# Patient Record
Sex: MALE | Race: WHITE | NOT HISPANIC OR LATINO | URBAN - METROPOLITAN AREA
[De-identification: names, ages, dates, MRNs, and addresses within clinical notes are randomized per-mention and may not be internally consistent; named-entity substitution may affect disease eponyms.]

---
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
## 2018-01-16 ENCOUNTER — FORM ENCOUNTER (OUTPATIENT)
Age: 18
End: 2018-01-16

## 2018-01-17 ENCOUNTER — APPOINTMENT (OUTPATIENT)
Dept: MRI IMAGING | Facility: HOSPITAL | Age: 18
End: 2018-01-17
Payer: COMMERCIAL

## 2018-01-17 ENCOUNTER — OUTPATIENT (OUTPATIENT)
Dept: OUTPATIENT SERVICES | Age: 18
LOS: 1 days | End: 2018-01-17

## 2018-01-17 DIAGNOSIS — Q23.1 CONGENITAL INSUFFICIENCY OF AORTIC VALVE: ICD-10-CM

## 2018-01-17 DIAGNOSIS — Q21.0 VENTRICULAR SEPTAL DEFECT: ICD-10-CM

## 2018-01-17 DIAGNOSIS — R68.89 OTHER GENERAL SYMPTOMS AND SIGNS: ICD-10-CM

## 2018-01-17 PROCEDURE — 70547 MR ANGIOGRAPHY NECK W/O DYE: CPT | Mod: 26

## 2018-01-17 PROCEDURE — 75561 CARDIAC MRI FOR MORPH W/DYE: CPT | Mod: 26

## 2018-01-17 PROCEDURE — 75565 CARD MRI VELOC FLOW MAPPING: CPT | Mod: 26

## 2018-01-17 PROCEDURE — 70545 MR ANGIOGRAPHY HEAD W/DYE: CPT | Mod: 26

## 2018-01-17 PROCEDURE — 71555 MRI ANGIO CHEST W OR W/O DYE: CPT | Mod: 26

## 2018-01-30 ENCOUNTER — RESULT REVIEW (OUTPATIENT)
Age: 18
End: 2018-01-30

## 2018-06-18 ENCOUNTER — APPOINTMENT (OUTPATIENT)
Dept: PEDIATRICS | Facility: CLINIC | Age: 18
End: 2018-06-18
Payer: COMMERCIAL

## 2018-06-18 VITALS
DIASTOLIC BLOOD PRESSURE: 78 MMHG | SYSTOLIC BLOOD PRESSURE: 116 MMHG | BODY MASS INDEX: 19.72 KG/M2 | HEIGHT: 73.5 IN | WEIGHT: 152 LBS

## 2018-06-18 DIAGNOSIS — Q21.0 VENTRICULAR SEPTAL DEFECT: ICD-10-CM

## 2018-06-18 DIAGNOSIS — Z87.74 PERSONAL HISTORY OF (CORRECTED) CONGENITAL MALFORMATIONS OF HEART AND CIRCULATORY SYSTEM: ICD-10-CM

## 2018-06-18 DIAGNOSIS — Z87.898 PERSONAL HISTORY OF OTHER SPECIFIED CONDITIONS: ICD-10-CM

## 2018-06-18 DIAGNOSIS — Z00.00 ENCOUNTER FOR GENERAL ADULT MEDICAL EXAMINATION W/OUT ABNORMAL FINDINGS: ICD-10-CM

## 2018-06-18 PROCEDURE — 81003 URINALYSIS AUTO W/O SCOPE: CPT | Mod: QW

## 2018-06-18 PROCEDURE — 99395 PREV VISIT EST AGE 18-39: CPT | Mod: 25

## 2018-06-18 NOTE — PHYSICAL EXAM
[General Appearance - Well Developed] : interactive [General Appearance - Well-Appearing] : well appearing [General Appearance - In No Acute Distress] : in no acute distress [Appearance Of Head] : the head was normocephalic [Sclera] : the conjunctiva were normal [Outer Ear] : the ears and nose were normal in appearance [Both Tympanic Membranes Were Examined] : both tympanic membranes were normal [Nasal Cavity] : the nasal mucosa and septum were normal [Examination Of The Oral Cavity] : the teeth, gums, and palate were normal [Oropharynx] : the oropharynx was normal  [Neck Cervical Mass (___cm)] : no neck mass was observed [Respiration, Rhythm And Depth] : normal respiratory rhythm and effort [Auscultation Breath Sounds / Voice Sounds] : clear bilateral breath sounds [Heart Rate And Rhythm] : heart rate and rhythm were normal [Heart Sounds] : normal S1 and S2 [Bowel Sounds] : normal bowel sounds [Abdomen Soft] : soft [Abdomen Tenderness] : non-tender [Abdominal Distention] : nondistended [Musculoskeletal Exam: Normal Movement Of All Extremities] : normal movements of all extremities [Motor Tone] : muscle strength and tone were normal [No Visual Abnormalities] : no visible abnormailities [Deep Tendon Reflexes (DTR)] : deep tendon reflexes were 2+ and symmetric [Generalized Lymph Node Enlargement] : no lymphadenopathy [Skin Color & Pigmentation] : normal skin color and pigmentation [] : no significant rash [Skin Lesions] : no skin lesions [Initial Inspection: Infant Active And Alert] : active and alert [Penis Abnormality] : the penis was normal [Scrotum] : the scrotum was normal [Testes Mass (___cm)] : there were no testicular masses [Darrel Stage _____] : the Darrel stage for pubic hair development was [unfilled]  [Murmurs] : no murmurs [FreeTextEntry1] : grade 2/6 Y[employer]  heard all along the sternal border

## 2018-06-18 NOTE — DEVELOPMENTAL MILESTONES
[0] : 2) Feeling down, depressed, or hopeless: Not at all (0) [Eats meals with family] : eats meals with family [Has famliy member/adult to turn to for help] : has family member/adult to turn to for help [Is permitted and is able to make independent decisions] : is permitted and is able to make independent decisions [Mother] : mother [Father] : father [Sister] : sister [NL] : normal [Eats regular meals including adequate fruits and vegetables] : eats regular meals including adequate fruits and vegetables [Drinks non-sweetened liquids] : drinks non-sweetened liquids [Calcium source] : has a source for calcium [Home is free of violence] : home is free of violence [Uses safety belts/safety equipment] : uses safety belts/safety equipment [Has ways to cope with stress] : has ways to cope with stress [Displays self-confidence] : displays self-confidence [[id number]] : 0 [Has concerns about body or appearance] : has no concerns about body or appearance [Uses tobacco/alcohol/drugs] : does not use tobacco/alcohol/drugs [Impaired/Distracted driving] : no impaired/distracted driving [Sexually Active] : The patient is not sexually active [Has problems with sleep] : has no problems with sleep [Gets depressed, anxious, or irritable / has mood swings] : does not get depressed, anxious, or irritable / has no mood swings [Has thoughts about hurting self or considered suicide] : has no thoughts about hurting self or considered suicide [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

## 2018-06-18 NOTE — REVIEW OF SYSTEMS
[Change in Activity] : no change in activity [Fever] : no fever [Wgt Loss (___ Lbs)] : no recent weight loss [Eye Discharge] : no eye discharge [Redness] : no redness [Swollen Eyelids] : no swollen eyelids [Change in Vision] : no change in vision  [Nasal Stuffiness] : no nasal congestion [Sore Throat] : no sore throat [Earache] : no earache [Nosebleeds] : no epistaxis [Cyanosis] : no cyanosis [Edema] : no edema [Diaphoresis] : not diaphoretic [Exercise Intolerance] : no persistence of exercise intolerance [Chest Pain] : no chest pain or discomfort [Palpitations] : no palpitations [Tachypnea] : not tachypneic [Wheezing] : no wheezing [Cough] : no cough [Shortness of Breath] : no shortness of breath [Change in Appetite] : no change in appetite [Vomiting] : no vomiting [Diarrhea] : no diarrhea [Abdominal Pain] : no abdominal pain [Constipation] : no constipation [Fainting (Syncope)] : no fainting [Seizure] : no seizures [Headache] : no headache [Dizziness] : no dizziness [Limping] : no limping [Joint Pains] : no arthralgias [Joint Swelling] : no joint swelling [Back Pain] : ~T no back pain [Muscle Aches] : no muscle aches [Rash] : no rash [Insect Bites] : no insect bites [Skin Lesions] : no skin lesions [Bruising] : no tendency for easy bruising [Swollen Glands] : no lymphadenopathy [Sleep Disturbances] : ~T no sleep disturbances [Hyperactive] : no hyperactive behavior [Emotional Problems] : no ~T emotional problems [Change In Personality] : ~T no personality change [Dec Urine Output] : no oliguria [Urinary Frequency] : no change in urinary frequency [Pain During Urination (Dysuria)] : no dysuria [Testicular Pain] : no testicular pain [Pubertal Concerns] : no pubertal concerns [FreeTextEntry1] : h/o multiple CHD disorders, asymptomatic , seen by cardiologist

## 2018-06-18 NOTE — HISTORY OF PRESENT ILLNESS
[Good] : good [Good Dental Hygiene] : Good [Up to Date] : Up to date [No Nutrition Concerns] : nutrition [No Sleep Concerns] : sleep [No Behavior Concerns] : behavior [No School Concerns] : school [No Developmental Concerns] : development [No Elimination Concerns] : elimination [Diverse, Healthy Diet] : his current diet is diverse and healthy [None] : No significant risk factors are identified [Adequate] : safety elements were discussed and are adequate [Exercises ___ Hr/Day] : [unfilled] hour(s) of exercise per day [Exercises ___ x/Wk] : ~he/she~ gets exercise [unfilled] times per week [Parents] : receives care from parents [In Child's Home] : in the child's home [Independent] : independent [Excellent] : excellent [TB Risk] : no tuberculosis risk factors [de-identified] : self [de-identified] : in good physical condition. 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 He attends [email or web address], Math, no specific concerns or questions.

## 2018-06-18 NOTE — DISCUSSION/SUMMARY
[Normal Growth] : growth [Normal Development] : development  [None] : No known medical problems [No Elimination Concerns] : elimination [No Feeding Concerns] : feeding [No Skin Concerns] : skin [Normal Sleep Pattern] : sleep [Physical Growth and Development] : physical growth and development [Social and Academic Competence] : social and academic competence [Emotional Well-Being] : emotional well-being [Risk Reduction] : risk reduction [Violence and Injury Prevention] : violence and injury prevention [No Medications] : ~He/She~ is not on any medications [Patient] : patient [de-identified] : opthalmology, astigmatism, myopia [FreeTextEntry1] : Jus is a healthy young adult , age 18, with an unremarkable physical examination. His immunizations are current . I will see him back in 1 year. I ordered a routine CBC and metabolic panel

## 2019-01-07 ENCOUNTER — APPOINTMENT (OUTPATIENT)
Dept: PEDIATRICS | Facility: CLINIC | Age: 19
End: 2019-01-07
Payer: COMMERCIAL

## 2019-01-07 VITALS — TEMPERATURE: 98 F

## 2019-01-07 DIAGNOSIS — I88.9 NONSPECIFIC LYMPHADENITIS, UNSPECIFIED: ICD-10-CM

## 2019-01-07 PROCEDURE — 99213 OFFICE O/P EST LOW 20 MIN: CPT

## 2019-01-07 RX ORDER — AMOXICILLIN AND CLAVULANATE POTASSIUM 875; 125 MG/1; MG/1
875-125 TABLET, COATED ORAL
Qty: 20 | Refills: 0 | Status: ACTIVE | COMMUNITY
Start: 2019-01-07 | End: 1900-01-01

## 2019-01-07 NOTE — DISCUSSION/SUMMARY
[FreeTextEntry1] : right anterior lymph node swollen and tender \par ? mono\par impression is lymphadenitis\par plan: Augmenting 40 mg/kg/d for 10 days

## 2019-01-07 NOTE — REVIEW OF SYSTEMS
[Malaise] : malaise [Sore Throat] : sore throat [Negative] : Genitourinary [FreeTextEntry1] : feels a large lump on the right neck

## 2019-01-07 NOTE — HISTORY OF PRESENT ILLNESS
[FreeTextEntry6] : sore throat, " feels like a knot" dysphagia, no fever, day 3 , no other symptoms, no fever, feels a large bump on the right neck

## 2019-01-07 NOTE — PHYSICAL EXAM
[Tired appearing] : tired appearing [Erythematous Oropharynx] : erythematous oropharynx [Tender] : tender [Enlarged] : enlarged [Anterior Cervical] : anterior cervical [NL] : warm [de-identified] : right anterior cervical node is enlarged and tender

## 2021-05-19 ENCOUNTER — TRANSCRIPTION ENCOUNTER (OUTPATIENT)
Age: 21
End: 2021-05-19

## 2021-08-28 ENCOUNTER — TRANSCRIPTION ENCOUNTER (OUTPATIENT)
Age: 21
End: 2021-08-28

## 2022-04-07 ENCOUNTER — APPOINTMENT (OUTPATIENT)
Dept: PAIN MANAGEMENT | Facility: CLINIC | Age: 22
End: 2022-04-07

## 2022-04-22 ENCOUNTER — APPOINTMENT (OUTPATIENT)
Dept: PAIN MANAGEMENT | Facility: CLINIC | Age: 22
End: 2022-04-22
Payer: COMMERCIAL

## 2022-04-22 VITALS — HEIGHT: 75 IN | WEIGHT: 160 LBS | BODY MASS INDEX: 19.89 KG/M2

## 2022-04-22 DIAGNOSIS — M51.26 OTHER INTERVERTEBRAL DISC DISPLACEMENT, LUMBAR REGION: ICD-10-CM

## 2022-04-22 PROCEDURE — 99214 OFFICE O/P EST MOD 30 MIN: CPT

## 2022-04-22 RX ORDER — METHYLPREDNISOLONE 4 MG/1
4 TABLET ORAL
Refills: 0 | Status: ACTIVE | COMMUNITY

## 2022-04-22 NOTE — PHYSICAL EXAM
[Orientated] : orientated [Able to Communicate] : able to communicate [Well Developed] : well developed [No Respiratory Distress] : no respiratory distress [] : positive equivocal straight leg raise [[id number]] : forward flexion 45 degrees [de-identified] : extension 20 degrees

## 2022-04-22 NOTE — HISTORY OF PRESENT ILLNESS
[Lower back] : lower back [10] : 10 [5] : 5 [Sharp] : sharp [Shooting] : shooting [] : yes [Constant] : constant [Meds] : meds [Walking] : walking [FreeTextEntry1] : 04/22/2022: follow up today. Since last visit did not have injection. Pain has not gotten better.  Pain in the left buttock and hamstring. No N/T. No weakness. Pain intermittent down the right lateral leg. \par \par 8/10/21: Patient presents for initial evaluation. He c/o of back and buttock pain that started over the winter after\par shoveling. He started playing sports and pain got worse in spring. Pain currently in the lower back without radiation to the legs. no n/t, no weakness. he tried PT with limited improvement.\par \par Subjective weakness: No\par Lower extremity paresthesias:No\par Bladder/bowel dysfunction:No\par Attempted modalities for current complaint:\par See above:\par Medications: Yes\par Advil PRN\par \par Injections:No\par Previous Spine Surgery: N/A\par \par Imaging:\par MRI Lumbar Spine (7/22/21) - 1. Convex left curvature with straightening of the lordosis.\par 2. L1-L2: Facet arthrosis.\par 3. L2-L3: Facet arthrosis.\par 4. L3-L4: Minor retrolisthesis. Broad bulge and facet arthrosis. Central and asymmetric to left herniation\par impressing on the thecal sac with moderate central stenosis.\par 5. L4-L5: Loss of disc signal and height. Minor retrolisthesis. Broad bulge and facet arthrosis. Broad central and\par asymmetric to left herniation extending into the proximal left foramen with foraminal stenosis and moderate\par left-sided canal stenosis.\par 6. L5-S1: Loss of disc height. Facet hypertrophy [FreeTextEntry7] : both legs

## 2022-04-22 NOTE — ASSESSMENT
[FreeTextEntry1] : After discussing various treatment options with the patient including but not limited to oral medications, physical therapy, exercise, modalities as well as interventional spinal injections, we have decided with the following plan:\par I personally reviewed the MRI/CT scan images and agree with the radiologist's report. The radiological findings were discussed with the patient.\par The risks, benefits, contents and alternatives to injection were explained in full to the patient. Risks outlined include but are not limited to infection,sepsis, bleeding, post-dural puncture headache, nerve damage, temporary increase in pain, syncopal episode, failure to resolve symptoms, allergic reaction, symptom recurrence, and elevation of blood sugar in diabetics. Cortisone may cause immunosuppression. Patient understands the risks. All questions were answered. After discussion of options, patient requested an injection. Information regarding the injection was given to the patient. Which medications to stop prior to the injection was explained to the patient as well.\par Follow up in 1-2 weeks post injection for re-evaluation. \par Continue Home exercises, stretching, activity modification, physical therapy, and conservative care.\par Patient is presenting with acute/sub-acute radicular pain with impairment in ADLs and functionality. The pain has not responded to conservative care including nsaid therapy and/or physical therapy. There is no bleeding tendency, unstable medical condition, or systemic infection.\par \par Patient is presenting with acute/sub-acute radicular pain with impairment in ADLs and functionality.  The pain has not responded to  conservative care including nsaid therapy and/or physical therapy.  There is no bleeding tendency, unstable medical condition, or systemic infection.\par \par LESI L4/5

## 2022-04-29 ENCOUNTER — APPOINTMENT (OUTPATIENT)
Dept: PAIN MANAGEMENT | Facility: CLINIC | Age: 22
End: 2022-04-29
Payer: COMMERCIAL

## 2022-04-29 DIAGNOSIS — M54.16 RADICULOPATHY, LUMBAR REGION: ICD-10-CM

## 2022-04-29 PROCEDURE — 99214 OFFICE O/P EST MOD 30 MIN: CPT

## 2022-04-29 PROCEDURE — 99204 OFFICE O/P NEW MOD 45 MIN: CPT

## 2022-04-29 NOTE — PROCEDURE
[FreeTextEntry3] : Date of Service: 04/29/2022 \par \par Account: [id number]\par \par Patient: KENDRICK RODAS \par \par YOB: 2000\par \par Age: 21 year\par \par Surgeon: Gayle West M.D.\par \par Pre-Operative Diagnosis:  Lumbosacral Radiculitis (M54.17)\par \par Post Operative Diagnosis: Lumbosacral Radiculitis (M54.17)\par \par Procedure: Interlaminar lumbar epidural steroid injection (L4-5) under fluoroscopic guidance\par \par Anesthesia:           MAC\par \par \par This procedure was carried out using fluoroscopic guidance.  The risks and benefits of the procedure were discussed extensively with the patient.  The consent of the patient was obtained and the following procedure was performed.\par \par The patient was placed in the prone position.  The lumbar area was prepped and draped in a sterile fashion.  Under AP view with slight cephalad-caudad angulation, the L4-5 interspace was identified and marked.  Using sterile technique the superficial skin was anesthetized with 1% Lidocaine without epinephrine.  A 20 gauge Tuohoy needle was advanced into the epidural space under fluoroscopy using [id number] technique and using loss of resistance at the L4-5 level.  After negative aspiration for heme or CSF, an epidurogram was obtained using 3 cc Omnipaque contrast confirming epidural placement of the needle. \par \par Lumbar epidurogram showed no intrathecal or intravascular spread and showed adequate bilateral epidural spread from L1 to S1 levels.\par \par After this, 5 cc of preservative free normal saline and 80 mg of Kenalog were injected into the epidural space.\par \par The needle was subsequently removed.  Anesthesia personnel were present throughout the procedure.\par \par The patient tolerated the procedure well and was instructed to contact me immediately if there were any problems.\par \par Gayle West M.D.\par \par 
Left sided, leg weakness./headache

## 2022-05-19 ENCOUNTER — APPOINTMENT (OUTPATIENT)
Dept: PAIN MANAGEMENT | Facility: CLINIC | Age: 22
End: 2022-05-19

## 2024-06-20 ENCOUNTER — APPOINTMENT (OUTPATIENT)
Dept: ORTHOPEDIC SURGERY | Facility: CLINIC | Age: 24
End: 2024-06-20

## 2024-06-27 ENCOUNTER — APPOINTMENT (OUTPATIENT)
Dept: ORTHOPEDIC SURGERY | Facility: CLINIC | Age: 24
End: 2024-06-27
Payer: COMMERCIAL

## 2024-06-27 VITALS — HEIGHT: 75 IN | WEIGHT: 175 LBS | BODY MASS INDEX: 21.76 KG/M2

## 2024-06-27 DIAGNOSIS — M25.311 OTHER INSTABILITY, RIGHT SHOULDER: ICD-10-CM

## 2024-06-27 DIAGNOSIS — M79.18 MYALGIA, OTHER SITE: ICD-10-CM

## 2024-06-27 PROCEDURE — 73030 X-RAY EXAM OF SHOULDER: CPT | Mod: RT

## 2024-06-27 PROCEDURE — 73010 X-RAY EXAM OF SHOULDER BLADE: CPT | Mod: RT

## 2024-06-27 PROCEDURE — 99204 OFFICE O/P NEW MOD 45 MIN: CPT

## 2024-08-08 ENCOUNTER — APPOINTMENT (OUTPATIENT)
Dept: ORTHOPEDIC SURGERY | Facility: CLINIC | Age: 24
End: 2024-08-08